# Patient Record
Sex: MALE | Race: BLACK OR AFRICAN AMERICAN
[De-identification: names, ages, dates, MRNs, and addresses within clinical notes are randomized per-mention and may not be internally consistent; named-entity substitution may affect disease eponyms.]

---

## 2019-09-04 ENCOUNTER — HOSPITAL ENCOUNTER (OUTPATIENT)
Dept: HOSPITAL 74 - JER | Age: 69
Setting detail: OBSERVATION
LOS: 1 days | Discharge: HOME | End: 2019-09-05
Attending: INTERNAL MEDICINE | Admitting: INTERNAL MEDICINE
Payer: MEDICARE

## 2019-09-04 VITALS — BODY MASS INDEX: 35.4 KG/M2

## 2019-09-04 DIAGNOSIS — E78.5: ICD-10-CM

## 2019-09-04 DIAGNOSIS — Z79.84: ICD-10-CM

## 2019-09-04 DIAGNOSIS — R07.89: Primary | ICD-10-CM

## 2019-09-04 DIAGNOSIS — Z87.891: ICD-10-CM

## 2019-09-04 DIAGNOSIS — E11.9: ICD-10-CM

## 2019-09-04 DIAGNOSIS — D72.1: ICD-10-CM

## 2019-09-04 DIAGNOSIS — I10: ICD-10-CM

## 2019-09-04 LAB
ALBUMIN SERPL-MCNC: 3.4 G/DL (ref 3.4–5)
ALP SERPL-CCNC: 100 U/L (ref 45–117)
ALT SERPL-CCNC: 19 U/L (ref 13–61)
ANION GAP SERPL CALC-SCNC: 3 MMOL/L (ref 8–16)
APTT BLD: 35.7 SECONDS (ref 25.2–36.5)
AST SERPL-CCNC: 9 U/L (ref 15–37)
BASOPHILS # BLD: 0.9 % (ref 0–2)
BILIRUB SERPL-MCNC: 0.3 MG/DL (ref 0.2–1)
BUN SERPL-MCNC: 8.5 MG/DL (ref 7–18)
CALCIUM SERPL-MCNC: 9.1 MG/DL (ref 8.5–10.1)
CHLORIDE SERPL-SCNC: 107 MMOL/L (ref 98–107)
CO2 SERPL-SCNC: 32 MMOL/L (ref 21–32)
CREAT SERPL-MCNC: 1 MG/DL (ref 0.55–1.3)
DEPRECATED RDW RBC AUTO: 13.8 % (ref 11.9–15.9)
EOSINOPHIL # BLD: 6.8 % (ref 0–4.5)
GLUCOSE SERPL-MCNC: 86 MG/DL (ref 74–106)
HCT VFR BLD CALC: 38.9 % (ref 35.4–49)
HGB BLD-MCNC: 12.9 GM/DL (ref 11.7–16.9)
INR BLD: 0.94 (ref 0.83–1.09)
LYMPHOCYTES # BLD: 39.9 % (ref 8–40)
MCH RBC QN AUTO: 28.2 PG (ref 25.7–33.7)
MCHC RBC AUTO-ENTMCNC: 33.1 G/DL (ref 32–35.9)
MCV RBC: 85.3 FL (ref 80–96)
MONOCYTES # BLD AUTO: 8.4 % (ref 3.8–10.2)
NEUTROPHILS # BLD: 44 % (ref 42.8–82.8)
PLATELET # BLD AUTO: 142 K/MM3 (ref 134–434)
PMV BLD: 10.3 FL (ref 7.5–11.1)
POTASSIUM SERPLBLD-SCNC: 4.2 MMOL/L (ref 3.5–5.1)
PROT SERPL-MCNC: 6.5 G/DL (ref 6.4–8.2)
PT PNL PPP: 11.1 SEC (ref 9.7–13)
RBC # BLD AUTO: 4.55 M/MM3 (ref 4–5.6)
SODIUM SERPL-SCNC: 142 MMOL/L (ref 136–145)
WBC # BLD AUTO: 5.4 K/MM3 (ref 4–10)

## 2019-09-04 PROCEDURE — G0378 HOSPITAL OBSERVATION PER HR: HCPCS

## 2019-09-04 PROCEDURE — 3E013GC INTRODUCTION OF OTHER THERAPEUTIC SUBSTANCE INTO SUBCUTANEOUS TISSUE, PERCUTANEOUS APPROACH: ICD-10-PCS | Performed by: INTERNAL MEDICINE

## 2019-09-04 PROCEDURE — A9502 TC99M TETROFOSMIN: HCPCS

## 2019-09-04 NOTE — PDOC
Attending Attestation





- Resident


Resident Name: JohannamylaBaldo





- ED Attending Attestation


I have performed the following: I have examined & evaluated the patient, The 

case was reviewed & discussed with the resident, I agree w/resident's findings 

& plan





- HPI


HPI: 





09/04/19 22:51


see resident hpi





- Physicial Exam


PE: 





09/04/19 22:52


agree with resident exam





- Medical Decision Making





09/04/19 22:54


68 yo male with chest pain and elevated Heart score


thought patient has recent travel exam, history and evaluation do not suggest PE


CXR showed NAPD


plan for admit to medical service





09/04/19 22:58

## 2019-09-04 NOTE — PDOC
History of Present Illness





- General


Chief Complaint: Chest Pain


Stated Complaint: CHEST PAINS


Time Seen by Provider: 09/04/19 19:39


History Source: Patient, Family


Exam Limitations: No Limitations





- History of Present Illness


Initial Comments: 





09/04/19 22:16


Roxy Grewal is a 69M with PMH HTN, NIDDM presenting with 3 days of R sided 

chest pain.





Patient has had a longstanding history of a sensation of pin pricks inside his 

R chest that comes and goes, and which he has thought of as gas and ignored. 

However, last 3 days has had multiple episodes concerning him enough to come to 

ED. Denies any inciting triggers or times of the day pain is related to, denies 

feeling the sensation being related to before or after eating. Denies shortness 

of breath, weakness, dizziness, abd pain. Says that sometimes his heart skips a 

beat, able to walk 2 blocks without getting tired, denies LE edema, denies 

injury to chest. Patient is from [facility] and arrived August 16, drives a lot. 

Heart has never been evaluated in the past for this.





NKDA, no surgeries, denies smoking history, alcohol rarely.








Past History





- Past Medical History


Allergies/Adverse Reactions: 


 Allergies











Allergy/AdvReac Type Severity Reaction Status Date / Time


 


No Known Allergies Allergy   Verified 09/05/19 02:11











Home Medications: 


Ambulatory Orders





Metformin HCl [Metformin HCl ER] 500 mg PO BID 09/05/19 


Ramipril [Altace] 5 mg PO DAILY 09/05/19 











- Suicide/Smoking/Psychosocial Hx


Smoking History: Former smoker


Have you smoked in the past 12 months: Yes


Information on smoking cessation initiated: No


Hx Alcohol Use: No


Drug/Substance Use Hx: No





Cardiac Specific PMH





- Complaint Specific PMHX


Abdominal Aortic Aneurysm: No


Angina: No


Cardiac Arrhythmia: No


Cardiac Stent: No


GERD: No


Myocardial Infarction: No


Pacemaker: No


Pulmonary Embolus: No


Valvular Heart Disease: No


Peripheral Vascular Disease: No





**Review of Systems





- Review of Systems


Able to Perform ROS?: Yes


Is the patient limited English proficient: No


Constitutional: No: Symptoms Reported


HEENTM: No: Symptoms Reported


Respiratory: No: Symptoms reported


Cardiac (ROS): Yes: Chest Pain, Irregular Heart Rate.  No: Lightheadedness, 

Chest Tightness


ABD/GI: No: Constipated, Diarrhea, Nausea, Vomiting


: No: Burning, Dysuria, Discharge, Frequency, Flank Pain, Hematuria, 

Incontinence, Pain, Urgency


Musculoskeletal: No: Back Pain, Neck Pain


Integumentary: No: Symptoms Reported


Neurological: No: Headache, Numbness, Paresthesia, Tingling


Endocrine: No: Symptoms Reported


Hematologic/Lymphatic: No: Symptoms Reported


All Other Systems: Reviewed and Negative





*Physical Exam





- Vital Signs


 Last Vital Signs











Temp Pulse Resp BP Pulse Ox


 


 98.8 F   69   19   154/62   99 


 


 09/04/19 19:38  09/04/19 19:38  09/04/19 19:38  09/04/19 19:38  09/05/19 01:30














- Physical Exam


General Appearance: Yes: Nourished, Appropriately Dressed, Obese.  No: Apparent 

Distress


HEENT: positive: EOMI, Normal ENT Inspection, Normal Voice, Symmetrical, 

Pharynx Normal.  negative: Scleral Icterus (R), Scleral Icterus (L), Pharyngeal 

Erythema, Tonsillar Exudate, Tonsillar Erythema, Nasal Congestion, Rhinorrhea


Neck: positive: Trachea midline, Normal Thyroid, Supple.  negative: Tender, 

Lymphadenopathy (R), Lymphadenopathy (L)


Respiratory/Chest: positive: Lungs Clear, Normal Breath Sounds, Respiratory 

Distress, Other (no obvious signs of injury to chest).  negative: Chest Tender, 

Decreased Breath Sounds, Crackles, Rales, Rhonchi


Cardiovascular: positive: Regular Rate, Irregularly Irregular (appreciated some 

possible afib).  negative: Edema


Gastrointestinal/Abdominal: positive: Normal Bowel Sounds, Soft, Protuberent.  

negative: Tender, Organomegaly, Hernia


Musculoskeletal: positive: Normal Inspection.  negative: CVA Tenderness, 

Decreased Range of Motion, Muscle Spasm, Vertebral Tenderness


Extremity: positive: Normal Capillary Refill, Normal Inspection, Normal Range 

of Motion.  negative: Tender


Integumentary: positive: Normal Color, Dry, Warm


Neurologic: positive: Fully Oriented, Alert, Normal Mood/Affect, Normal Response





**Heart Score/ECG Review





- History


History: Moderately suspicious





- Electrocardiogram


EKG: Normal





- Age


Age: >/= 65





- Risk Factors


Risk Factors Heart Score: Yes Hx Hypertension, Yes Hx Diabetes


Based on the list above the patient has:: >/=3 risk factors or Hx 

atherosclerotic disease





- Troponin


Troponin: </= normal limit





- Score


Heart Score - Total: 5





ED Treatment Course





- LABORATORY


CBC & Chemistry Diagram: 


 09/04/19 20:58





 09/04/19 20:58





- ADDITIONAL ORDERS


Additional order review: 


 Laboratory  Results











  09/04/19 09/04/19





  20:58 20:58


 


PT with INR  11.10 


 


INR  0.94 


 


PTT (Actin FS)  35.7 


 


Sodium   142


 


Potassium   4.2


 


Chloride   107


 


Carbon Dioxide   32


 


Anion Gap   3 L


 


BUN   8.5


 


Creatinine   1.0


 


Est GFR (CKD-EPI)AfAm   89.86


 


Est GFR (CKD-EPI)NonAf   77.54


 


Random Glucose   86


 


Calcium   9.1


 


Total Bilirubin   0.3


 


AST   9 L


 


ALT   19


 


Alkaline Phosphatase   100


 


Creatine Kinase   84


 


Troponin I   < 0.02


 


Total Protein   6.5


 


Albumin   3.4








 











  09/04/19





  20:58


 


RBC  4.55


 


MCV  85.3


 


MCHC  33.1


 


RDW  13.8


 


MPV  10.3


 


Neutrophils %  44.0


 


Lymphocytes %  39.9


 


Monocytes %  8.4


 


Eosinophils %  6.8 H


 


Basophils %  0.9














- RADIOLOGY


Radiology Studies Ordered: 














 Category Date Time Status


 


 CHEST X-RAY PORTABLE* [RAD] Stat Radiology  09/04/19 21:28 Completed














Medical Decision Making





- Medical Decision Making





09/04/19 22:16


Roxy Grewal is a 69M with PMH HTN, NIDDM presenting with 3 days of chest pain.





Patient presentation concerning for MI vs. AAA vs. GERD vs. ACS vs. PE vs. 

muscle spasm.





Will evaluate via:





CMP


CBC


CP


ECG


CXR





Initial troponins negative. ECG no ST elevations. However, has DM, obesity, HTN

, and history concerning for cardiac issues, has not had good f/u care.


HEART score 5, needs overnight observation for repeat troponins and ECHO.


No electrolyte imbalance noted on labs concerning for muscle spasms, etc.


Repeat ECG performed due to baseline artifact on 1st ECG, no concerning 

findings notable.





09/05/19 01:34


Discussed admission to tele/obs with Dr. Gillette under Dr. Reed.








*DC/Admit/Observation/Transfer


Diagnosis at time of Disposition: 


Chest pain


Qualifiers:


 Chest pain type: other chest pain Qualified Code(s): R07.89 - Other chest pain








- Discharge Dispostion


Decision to Admit order: Yes





- Referrals





- Patient Instructions





- Post Discharge Activity

## 2019-09-04 NOTE — PDOC
Rapid Medical Evaluation


Time Seen by Provider: 09/04/19 19:39


Medical Evaluation: 





09/04/19 19:39


I have performed a brief in-person evaluation of this patient.





The patient presents with a chief complaint of: chest pain


 


Pertinent physical exam findings:stable and in NAD, non-focal





I have ordered the following:labs, ekg





The patient will proceed to the ED for further evaluation.

## 2019-09-05 VITALS — SYSTOLIC BLOOD PRESSURE: 130 MMHG | HEART RATE: 65 BPM | DIASTOLIC BLOOD PRESSURE: 73 MMHG

## 2019-09-05 VITALS — TEMPERATURE: 97.8 F

## 2019-09-05 LAB
ANION GAP SERPL CALC-SCNC: 8 MMOL/L (ref 8–16)
BASOPHILS # BLD: 1 % (ref 0–2)
BUN SERPL-MCNC: 6.3 MG/DL (ref 7–18)
CALCIUM SERPL-MCNC: 8.5 MG/DL (ref 8.5–10.1)
CHLORIDE SERPL-SCNC: 104 MMOL/L (ref 98–107)
CHOLEST SERPL-MCNC: 117 MG/DL (ref 50–200)
CO2 SERPL-SCNC: 32 MMOL/L (ref 21–32)
CREAT SERPL-MCNC: 0.8 MG/DL (ref 0.55–1.3)
DEPRECATED RDW RBC AUTO: 13.7 % (ref 11.9–15.9)
EOSINOPHIL # BLD: 8.2 % (ref 0–4.5)
GLUCOSE SERPL-MCNC: 91 MG/DL (ref 74–106)
HCT VFR BLD CALC: 38.8 % (ref 35.4–49)
HDLC SERPL-MCNC: 39 MG/DL (ref 40–60)
HGB BLD-MCNC: 13.2 GM/DL (ref 11.7–16.9)
LYMPHOCYTES # BLD: 37.4 % (ref 8–40)
MAGNESIUM SERPL-MCNC: 2.3 MG/DL (ref 1.8–2.4)
MCH RBC QN AUTO: 28.7 PG (ref 25.7–33.7)
MCHC RBC AUTO-ENTMCNC: 34 G/DL (ref 32–35.9)
MCV RBC: 84.4 FL (ref 80–96)
MONOCYTES # BLD AUTO: 8.2 % (ref 3.8–10.2)
NEUTROPHILS # BLD: 45.2 % (ref 42.8–82.8)
PLATELET # BLD AUTO: 135 K/MM3 (ref 134–434)
PMV BLD: 10.3 FL (ref 7.5–11.1)
POTASSIUM SERPLBLD-SCNC: 3.8 MMOL/L (ref 3.5–5.1)
RBC # BLD AUTO: 4.6 M/MM3 (ref 4–5.6)
SODIUM SERPL-SCNC: 144 MMOL/L (ref 136–145)
TRIGL SERPL-MCNC: 56 MG/DL (ref 0–150)
WBC # BLD AUTO: 4.8 K/MM3 (ref 4–10)

## 2019-09-05 RX ADMIN — INSULIN ASPART SCH: 100 INJECTION, SOLUTION INTRAVENOUS; SUBCUTANEOUS at 12:42

## 2019-09-05 RX ADMIN — INSULIN ASPART SCH: 100 INJECTION, SOLUTION INTRAVENOUS; SUBCUTANEOUS at 17:19

## 2019-09-05 RX ADMIN — HEPARIN SODIUM SCH UNIT: 5000 INJECTION, SOLUTION INTRAVENOUS; SUBCUTANEOUS at 14:43

## 2019-09-05 RX ADMIN — HEPARIN SODIUM SCH: 5000 INJECTION, SOLUTION INTRAVENOUS; SUBCUTANEOUS at 12:42

## 2019-09-05 RX ADMIN — INSULIN ASPART SCH: 100 INJECTION, SOLUTION INTRAVENOUS; SUBCUTANEOUS at 06:38

## 2019-09-05 NOTE — PN
Teaching Attending Note


Name of Resident: Mira Gupta





ATTENDING PHYSICIAN STATEMENT





I saw and evaluated the patient.


I reviewed the resident's note and discussed the case with the resident.


I agree with the resident's findings and plan as documented.








SUBJECTIVE:cp has resolved. denies Cp, SOB, fever, chills, N/v/C/D








OBJECTIVE:


 Last Vital Signs











Temp Pulse Resp BP Pulse Ox


 


 97.8 F   65   18   130/73   96 


 


 09/05/19 08:20  09/05/19 14:43  09/05/19 14:43  09/05/19 14:43  09/05/19 14:43








General NAD


CV S1 S2 RRR no murmur/rub/gallop


Lungs CTA B/L no wheezing/rales/rhonchi





ASSESSMENT AND PLAN:


70yo M with PMH HTN, DM, and former smoker presented to the ER wtih CP x3 days


1. r/o ACS- trop neg x3. Echo and stress test done and are negative. seen by 

cardio. can d/c and follow up as outpatient

## 2019-09-05 NOTE — CON.CARD
Consult


Consult Specialty:: Cardiology


Referred by:: Jason Reed


Reason for Consultation:: Chest pain





- History of Present Illness


Chief Complaint: chest pain


History of Present Illness: 


69 year old male with a pmhx of dm, htn, and hld who presents with chest pain.  

Patient having intermittent right sided chest pain mild that comes and goes.  

Not exertional and non radiating.  No sob or palpitations associated.  Some 

dyspnea on exertion noted as well.


No pnd, orthopnea, or edema.  No palpitations.  No near syncope.








- History Source


History Provided By: Patient, Medical Record





- Alcohol/Substance Use


Hx Alcohol Use: No





- Smoking History


Smoking history: Former smoker


Have you smoked in the past 12 months: Yes





Home Medications





- Allergies


Allergies/Adverse Reactions: 


 Allergies











Allergy/AdvReac Type Severity Reaction Status Date / Time


 


ibuprofen Allergy   Verified 09/05/19 15:02














- Home Medications


Home Medications: 


Ambulatory Orders





Acetaminophen 500 mg PO QID 09/05/19 


Amlodipine Besylate [Norvasc -] 10 mg PO DAILY 09/05/19 


Aspirin Coated [Ecotrin -] 81 mg PO DAILY 09/05/19 


Atorvastatin Ca [Lipitor] 40 mg PO HS 09/05/19 


Doxazosin Mesylate 2 mg PO DAILY 09/05/19 


Metformin HCl [Metformin HCl ER] 500 mg PO BID 09/05/19 


Patient Specific Medications [Pt Own Med Drawer] 1 tab PO DAILY 09/05/19 


Ramipril [Altace] 5 mg PO DAILY 09/05/19 


Vitamin B Complex [B Complex] 1 tab PO DAILY 09/05/19 


metFORMIN HCL [Metformin HCl] 500 mg PO BID 09/05/19 








Vital Signs: 


 Vital Signs











Temperature  97.8 F   09/05/19 08:20


 


Pulse Rate  65   09/05/19 14:43


 


Respiratory Rate  18   09/05/19 14:43


 


Blood Pressure  130/73   09/05/19 14:43


 


O2 Sat by Pulse Oximetry (%)  96   09/05/19 14:43











Constitutional: Yes: No Distress


Neck: Yes: Supple


Respiratory: Yes: CTA Bilaterally


Gastrointestinal: Yes: Soft


Cardiovascular: Yes: Regular Rate and Rhythm


JVD: No


Carotid Bruit: No


PMI: Non-Displaced


Heart Sounds: Yes: S1, S2


Murmur: No: Systolic Murmur


Edema: No





- Other Data


Labs, Other Data: 


 CBC, BMP





 09/05/19 06:30 





 09/05/19 06:30 





 INR, PTT











INR  0.94  (0.83-1.09)   09/04/19  20:58    








 Troponin, BNP











  09/04/19 09/05/19 09/05/19





  20:58 01:43 06:30


 


Troponin I  < 0.02  < 0.02  < 0.02








 Troponin, BNP











  09/04/19 09/05/19 09/05/19





  20:58 01:43 06:30


 


Troponin I  < 0.02  < 0.02  < 0.02














Imaging





- Results


Chest X-ray: Report Reviewed


EKG: Image Reviewed





Problem List





- Problems


(1) Chest pain


Code(s): R07.9 - CHEST PAIN, UNSPECIFIED   


Qualifiers: 


   Chest pain type: other chest pain   Qualified Code(s): R07.89 - Other chest 

pain; R07.8 - Other chest pain   





Assessment/Plan


69 year old male with a pmhx of dm, htn, and hld who presents with chest pain.  

Patient having intermittent right sided chest pain mild that comes and goes.  

Not exertional and non radiating.  No sob or palpitations associated.  Some 

dyspnea on exertion noted as well.


No pnd, orthopnea, or edema.  No palpitations.  No near syncope.





1) Atypical chest pain


-EKG: sinus rhythm at 60bpm, nl axis, nonspecific t wave abnormalities.


No acute ischemic ecg changes. 


CE's negative


-Echocardiogram normal LVEF and no significant valve disease


NST with normal myocardial perfusion.


No events on tele.


No signs of chf on exam, cxr, or bnp level





No further cardiac testing at this time.  Aspirin/statin and bp control.





Follow up with Dr. Mcelroy as an outpatient [phone]

## 2019-09-05 NOTE — ECHO
______________________________________________________________________________



Name: LESLI BHAT                                   Exam:Adult Echocardiogram

MRN: [id number]         Study Date: 2019 10:10 AM

Age: 69 yrs

______________________________________________________________________________



Reason For Study: Chest pain

Height: 69 in        Weight: 240 lb        BSA: 2.2 m2



______________________________________________________________________________



MMode/2D Measurements & Calculations

IVSd: 1.1 cm                                          Ao root diam: 3.1 cm

LVIDd: 4.6 cm                                         LA dimension: 4.0 cm

LVIDs: 3.0 cm

LVPWd: 0.95 cm



_______________________________________________________

EDV(Teich): 97.3 ml                                   LVOT diam: 2.2 cm

ESV(Teich): 33.7 ml



Doppler Measurements & Calculations

MV E max isaac: 96.7 cm/sec                            Ao V2 max: 132.6 cm/sec

MV A max isaac: 22.2 cm/sec                            Ao max P.0 mmHg

MV E/A: 4.4                                          Ao V2 mean: 90.0 cm/sec

MV dec time: 0.23 sec                                Ao mean PG: 3.8 mmHg

                                                     Ao V2 VTI: 33.6 cm



                                                     JESSICA(I,D): 3.1 cm2

                                                     JESSICA(V,D): 2.9 cm2



______________________________________________________

LV V1 max P.0 mmHg                               SV(LVOT): 104.6 ml

LV V1 mean P.2 mmHg

LV V1 max: 100.2 cm/sec

LV V1 mean: 71.0 cm/sec

LV V1 VTI: 27.2 cm

______________________________________________________



TR max isaac: 222.9 cm/sec                             Med Peak E' Isaac: 4.5 cm/sec

TR max P.2 mmHg                                 Med E/e': 21.6

                                                     Lat Peak E' Isaac: 7.8 cm/sec

                                                     Lat E/e': 12.4





______________________________________________________________________________

Procedure

A complete two-dimensional transthoracic echocardiogram was performed (2D, M-mode, Doppler and color 
flow

Doppler).

Left Ventricle

The left ventricular size, thickness and function are normal. The left ventricular ejection fraction 
is

normal. Ejection Fraction = 60-65%. The left ventricular wall motion is normal.

Right Ventricle

The right ventricle is normal in size and function.

Atria

Normal left and right atrial size and function.

Mitral Valve

There is no mitral regurgitation noted.

Tricuspid Valve

There is trace tricuspid regurgitation. There was insufficient TR detected to calculate RV systolic p
ressure.

Aortic Valve

No hemodynamically significant valvular aortic stenosis. No aortic regurgitation is present.

Pulmonic Valve

There is no pulmonic valvular regurgitation.

Great Vessels

The aortic root is normal size.

Pericardium/Pleura

There is no pericardial effusion.

______________________________________________________________________________





Interpretation Summary

The left ventricular size, thickness and function are normal

The right ventricle is normal in size and function.

There is trace tricuspid regurgitation.





MD Ulices Post 2019 02:57 PM

## 2019-09-05 NOTE — EKG
Test Reason : 

Blood Pressure : ***/*** mmHG

Vent. Rate : 060 BPM     Atrial Rate : 060 BPM

   P-R Int : 142 ms          QRS Dur : 082 ms

    QT Int : 424 ms       P-R-T Axes : 059 017 050 degrees

   QTc Int : 424 ms

 

NORMAL SINUS RHYTHM WITH SINUS ARRHYTHMIA

NONSPECIFIC T WAVE ABNORMALITY

ABNORMAL ECG

WHEN COMPARED WITH ECG OF 04-SEP-2019 20:02,

NO SIGNIFICANT CHANGE WAS FOUND

Confirmed by MITCHEL MEADOWS, JENNY ([id number]) on 9/5/2019 12:24:02 PM

 

Referred By:             Confirmed By:JENNY GRULLON MD

## 2019-09-05 NOTE — PN
Teaching Attending Note


Name of Resident: Dash Felipe





ATTENDING PHYSICIAN STATEMENT





I saw and evaluated the patient.


Chart, data, imaging reviewed. 


I reviewed the resident's note and discussed the case with the resident.


I agree with the resident's findings and plan as documented.








SUBJECTIVE:


70yo man from [facility] with htn, dm, former smoker (quit 20 years ago) obesity c/

o 3  days of intermittent right chest pressure like pain, no radiation worse 

after eating, no relation to exercise. Denied family history of cardiac 

disease. Never seen cardiologist or had cardiac stress test. No shortness of 

breath, nausea, or vomiting. 





OBJECTIVE:


 Last Vital Signs











Temp Pulse Resp BP Pulse Ox


 


 98.8 F   69   19   154/62   99 


 


 09/04/19 19:38  09/04/19 19:38  09/04/19 19:38  09/04/19 19:38  09/04/19 19:38








general- nad, appears comfortable 


cor-s1+s2+ rrr, no murmurs appreciated 


chest - clear lung sounds b/l 


abdomen-obese, no ruq tenderness 


lower ext -1+ pitting edema in shin b/l 














 Abnormal Lab Results











  09/04/19 09/04/19





  20:58 20:58


 


Eosinophils %  6.8 H 


 


Anion Gap   3 L


 


AST   9 L





imaging ,ekg reviewed 





ASSESSMENT AND PLAN:


atypical chest pain, heart score- 4. R/o out gallstones as pain worse after 

eating. LFTs wnl. ekg with out ischemic changes. Patient currently denied any 

pain. 


-tele/obs 


-echo 


-trend trops 


-cardiology eval - for cardiac stress test 


-liver u/s to r/o gallstones 





#DM 


-send a1c 


-insulin sliding scale 





#Eosinophilia 


-trend eos 


-no symptoms or evidence of parasitic infection at this time 





#HTN 


-HCTZ 





dvt ppx- heparin sc

## 2019-09-05 NOTE — DS
Physical Exam: 


SUBJECTIVE: Patient seen and examined. Denies any current chest pain, SOB, n/v.








OBJECTIVE:





 Vital Signs











 Period  Temp  Pulse  Resp  BP Sys/Diane  Pulse Ox


 


 Last 24 Hr  97.8 F-98.1 F  54-68  18-20  128-153/53-95  96-99








PHYSICAL EXAM





GENERAL: The patient is awake, alert, and fully oriented, in no acute distress.


HEAD: Normal with no signs of trauma.


EYES: PERRL, extraocular movements intact, sclera anicteric, conjunctiva clear. 


ENT: Ears normal, nares patent, oropharynx clear without exudates, moist mucous 

membranes.


NECK: Trachea midline, full range of motion, supple. 


LUNGS: Breath sounds equal, clear to auscultation bilaterally, no wheezes, no 

crackles, no accessory muscle use. 


HEART: Regular rate and rhythm, S1, S2 without murmur, rub or gallop.


ABDOMEN: Soft, nontender, nondistended, normoactive bowel sounds, no guarding, 

no rebound, no hepatosplenomegaly, no masses.


EXTREMITIES: 2+ pulses, warm, well-perfused, no edema. 


NEUROLOGICAL: Cranial nerves II through XII grossly intact. Normal speech, gait 

not observed.


PSYCH: Normal mood, normal affect.


SKIN: Warm, dry, normal turgor, no rashes or lesions noted.





LABS


 Laboratory Results - last 24 hr











  09/04/19 09/04/19 09/04/19





  20:58 20:58 20:58


 


WBC  5.4  


 


RBC  4.55  


 


Hgb  12.9  


 


Hct  38.9  


 


MCV  85.3  


 


MCH  28.2  


 


MCHC  33.1  


 


RDW  13.8  


 


Plt Count  142  


 


MPV  10.3  


 


Absolute Neuts (auto)  2.4  


 


Neutrophils %  44.0  


 


Lymphocytes %  39.9  


 


Monocytes %  8.4  


 


Eosinophils %  6.8 H  


 


Basophils %  0.9  


 


Nucleated RBC %  0  


 


PT with INR    11.10


 


INR    0.94


 


PTT (Actin FS)    35.7


 


Sodium   142 


 


Potassium   4.2 


 


Chloride   107 


 


Carbon Dioxide   32 


 


Anion Gap   3 L 


 


BUN   8.5 


 


Creatinine   1.0 


 


Est GFR (CKD-EPI)AfAm   89.86 


 


Est GFR (CKD-EPI)NonAf   77.54 


 


POC Glucometer   


 


Random Glucose   86 


 


Hemoglobin A1c %   


 


Calcium   9.1 


 


Magnesium   


 


Total Bilirubin   0.3 


 


AST   9 L 


 


ALT   19 


 


Alkaline Phosphatase   100 


 


Creatine Kinase   84 


 


Troponin I   < 0.02 


 


Total Protein   6.5 


 


Albumin   3.4 


 


Triglycerides   


 


Cholesterol   


 


Total LDL Cholesterol   


 


HDL Cholesterol   


 


TSH   














  09/05/19 09/05/19 09/05/19





  01:43 06:30 06:30


 


WBC   4.8 


 


RBC   4.60 


 


Hgb   13.2 


 


Hct   38.8 


 


MCV   84.4 


 


MCH   28.7 


 


MCHC   34.0 


 


RDW   13.7 


 


Plt Count   135 


 


MPV   10.3 


 


Absolute Neuts (auto)   2.2 


 


Neutrophils %   45.2 


 


Lymphocytes %   37.4 


 


Monocytes %   8.2 


 


Eosinophils %   8.2 H 


 


Basophils %   1.0 


 


Nucleated RBC %   0 


 


PT with INR   


 


INR   


 


PTT (Actin FS)   


 


Sodium    144


 


Potassium    3.8


 


Chloride    104


 


Carbon Dioxide    32


 


Anion Gap    8


 


BUN    6.3 L


 


Creatinine    0.8


 


Est GFR (CKD-EPI)AfAm    107.13


 


Est GFR (CKD-EPI)NonAf    92.44


 


POC Glucometer   


 


Random Glucose    91


 


Hemoglobin A1c %   


 


Calcium    8.5


 


Magnesium    2.3


 


Total Bilirubin   


 


AST   


 


ALT   


 


Alkaline Phosphatase   


 


Creatine Kinase   


 


Troponin I  < 0.02   < 0.02


 


Total Protein   


 


Albumin   


 


Triglycerides    56


 


Cholesterol    117


 


Total LDL Cholesterol    67


 


HDL Cholesterol    39 L


 


TSH    1.08














  09/05/19 09/05/19 09/05/19





  06:30 06:35 17:17


 


WBC   


 


RBC   


 


Hgb   


 


Hct   


 


MCV   


 


MCH   


 


MCHC   


 


RDW   


 


Plt Count   


 


MPV   


 


Absolute Neuts (auto)   


 


Neutrophils %   


 


Lymphocytes %   


 


Monocytes %   


 


Eosinophils %   


 


Basophils %   


 


Nucleated RBC %   


 


PT with INR   


 


INR   


 


PTT (Actin FS)   


 


Sodium   


 


Potassium   


 


Chloride   


 


Carbon Dioxide   


 


Anion Gap   


 


BUN   


 


Creatinine   


 


Est GFR (CKD-EPI)AfAm   


 


Est GFR (CKD-EPI)NonAf   


 


POC Glucometer   90  155


 


Random Glucose   


 


Hemoglobin A1c %  6.6 H  


 


Calcium   


 


Magnesium   


 


Total Bilirubin   


 


AST   


 


ALT   


 


Alkaline Phosphatase   


 


Creatine Kinase   


 


Troponin I   


 


Total Protein   


 


Albumin   


 


Triglycerides   


 


Cholesterol   


 


Total LDL Cholesterol   


 


HDL Cholesterol   


 


TSH   











HOSPITAL COURSE:


Mr. Grewal is a 68y/o male with HTN, DM, cataracts presents after intermittent 

right sided chest pain x 3 days. There is no association with activity. Cardiac 

exam was unremarkable. EKG showed non-specific T wave flattening, and trops 

were negative x2. Echo showed normal EF, and stress test had no myocardial 

ischemia. No significant events on tele. He is cleared for d/c and referred to 

Dr. Mcelroy for out pt f/u. Pt is to continue ASA, statin, and HTN meds.





Date of Admission:09/04/19





Date of Discharge: 09/05/19











Minutes to complete discharge: 35





Discharge Summary


Reason For Visit: CHEST PAINS


Current Active Problems





Chest pain (Acute)


Diabetes mellitus (Chronic)


Hypertension (Chronic)








Condition: Stable





- Instructions


Diet, Activity, Other Instructions: 


Hospital Visit:


You were admitted to the hospital for chest pain. The stress test of your heart 

was performed, with normal results. You were evaluated by the cardiologist and 

are cleared for discharge home. 





Medications:


You may continue your home medications as directed.





Follow up:


Please follow up with Dr. Mcelroy in the next week to discuss further testing 

that may need to be done.


Please follow up with your primary care doctor in the next week to discuss your 

diabetes.


Your hemoglobin A1C, the test that helps with diabetes management, was slightly 

elevated at 6.6. You may need medication adjustments, so please follow up with 

your primary care doctor within the next week. Monitor your diet in the meantime

, and do not eat foods that contain a lot of sugar, like cakes and soda.





Other Instructions:


Return to the nearest emergency room if you experience worsening symptoms, 

chest pain, shortness of breath, dizziness, nausea and vomiting, or loss of 

consciousness.


Referrals: 


Johnathon Singer MD [Staff Physician] - 


Alex Mcelroy MD [Staff Physician] - 


Disposition: HOME





- Home Medications


Comprehensive Discharge Medication List: 


Ambulatory Orders





Acetaminophen 500 mg PO QID 09/05/19 


Amlodipine Besylate [Norvasc -] 10 mg PO DAILY 09/05/19 


Aspirin Coated [Ecotrin -] 81 mg PO DAILY 09/05/19 


Atorvastatin Ca [Lipitor] 40 mg PO HS 09/05/19 


Doxazosin Mesylate 2 mg PO DAILY 09/05/19 


Ramipril [Altace] 5 mg PO DAILY 09/05/19 


Vitamin B Complex [B Complex] 1 tab PO DAILY 09/05/19 


metFORMIN HCL [Metformin HCl] 500 mg PO BID 09/05/19 








This patient is new to me today: Yes


Date on this admission: 09/04/19


Emergency Visit: Yes


ED Registration Date: 09/04/19


Care time: The patient presented to the Emergency Department on the above date 

and was hospitalized for further evaluation of their emergent condition.


Critical Care patient: No





- Discharge Referral


Referred to [facility] No





ATTENDING PHYSICIAN STATEMENT





I saw and evaluated the patient.


I reviewed the resident's note and discussed the case with the resident.


I agree with the resident's findings and plan as documented.








SUBJECTIVE:








OBJECTIVE:








ASSESSMENT AND PLAN:

## 2019-09-05 NOTE — EKG
Test Reason : 

Blood Pressure : ***/*** mmHG

Vent. Rate : 060 BPM     Atrial Rate : 060 BPM

   P-R Int : 136 ms          QRS Dur : 078 ms

    QT Int : 402 ms       P-R-T Axes : 059 001 027 degrees

   QTc Int : 402 ms

 

NORMAL SINUS RHYTHM

NONSPECIFIC T WAVE ABNORMALITY

ABNORMAL ECG

NO PREVIOUS ECGS AVAILABLE

Confirmed by MITCHEL MEADOWS, JENNY ([id number]) on 9/5/2019 12:25:13 PM

 

Referred By:             Confirmed By:JENNY GRULLON MD

## 2019-09-05 NOTE — HP
CHIEF COMPLAINT: R sided chest pain





PCP: In [facility]





HISTORY OF PRESENT ILLNESS:


Roxy Grewal is a 69 year old male with a past medical history of hypertension

, diabetes, cataracts, "prostate issue" who presented to the ED with 3 days 

worth of R sided chest pain. The patient stated that over the last 3 days, he 

had been having sudden onset right sided chest pain that feels like a shock and 

sharp pain on his chest that causes him a brief moment of weakness and then 

becomes a dull pain and goes away in approximately 5 minutes. The pain occurs 

anywhere from 1 to 3 times per day and is not associated with any particular 

time of the day and not worsened with movement. Pain is occasionally worsened 

with food but not always directly associated with eating. He states that he has 

had this kind of pain before that he attributed to gas several months prior 

however now he states that the pain is worse. States that the pain is 5-6/10 at 

its worst. Pointed to the right side of his chest, midclavicular line when 

asked to point to where the pain is the worst when he gets it. He has not taken 

anything for alleviation of pain. Denied any recent trauma to the area or 

strenuous activity. Denied any episodes of syncope, loss of consciousness, 

falls. Does not have associated symptoms of fever, chills, shortness of breath, 

abdominal pain, nausea, vomiting, headaches, visual changes, dysuria, hematuria

, increased belching or gas.


States he does have urinary frequency and takes a medication he is unsure of 

for a prostate issue.





ER course was notable for:


(1) EKG NSR with flattening of T waves in all leads except V1, V2, V3, V4


(2) negative troponins


(3) CXR with no acute pathology





Recent Travel:


recently travelled from [facility] [place]





PAST MEDICAL HISTORY:


as above





PAST SURGICAL HISTORY:


denies





Social History:


Smoking: former smoker, quit more than 20 years ago


Alcohol: socially, rarely


Drugs: denies


Currently visiting sister in [place], lives in [facility]





Family History:


Mother- HTN





Allergies





No Known Allergies Allergy (Verified 09/04/19 19:43)


 








HOME MEDICATIONS:


REVIEW OF SYSTEMS


CONSTITUTIONAL: brief weakness episodes


Absent:  fever, chills, diaphoresis, malaise, loss of appetite, weight change


HEENT: 


Absent:  rhinorrhea, nasal congestion, throat pain, throat swelling, difficulty 

swallowing, visual changes


CARDIOVASCULAR: intermittent chest pain


Absent: syncope, palpitations, irregular heart rate, lightheadedness, 

peripheral edema


RESPIRATORY: 


Absent: cough, shortness of breath, dyspnea with exertion, orthopnea, wheezing, 


GASTROINTESTINAL:


Absent: abdominal pain, abdominal distension, nausea, vomiting, diarrhea, 

constipation,


GENITOURINARY: frequency at night


Absent: dysuria, urgency, hesitancy, hematuria, flank pain


MUSCULOSKELETAL: 


Absent: myalgia, arthralgia, joint swelling, back pain, neck pain


SKIN: 


Absent: rash, itching, pallor


HEMATOLOGIC/IMMUNOLOGIC: 


Absent: easy bleeding, easy bruising, lymphadenopathy, frequent infections


ENDOCRINE:


Absent: unexplained weight gain, unexplained weight loss, heat intolerance, 

cold intolerance


NEUROLOGIC: 


Absent: headache, focal weakness or paresthesias, dizziness, unsteady gait, 

seizure, mental status changes,


PSYCHIATRIC: 


Absent: anxiety, depression, suicidal or homicidal ideation, hallucinations.








PHYSICAL EXAMINATION


 Vital Signs - 24 hr











  09/04/19





  19:38


 


Temperature 98.8 F


 


Pulse Rate 69


 


Respiratory 19





Rate 


 


Blood Pressure 154/62


 


O2 Sat by Pulse 99





Oximetry (%) 











GENERAL: Awake, alert, and fully oriented, in no acute distress.


HEAD: Normal with no signs of trauma.


EYES: Pupils equal, round and reactive to light, extraocular movements intact, 

sclera anicteric, conjunctival injection.


EARS, NOSE, THROAT: Oropharynx clear without exudates. Moist mucous membranes.


NECK: Normal range of motion, supple without lymphadenopathy, JVD. 


LUNGS: Breath sounds equal, clear to auscultation bilaterally. No wheezes, and 

no crackles. No accessory muscle use.


HEART: Regular rate and rhythm, normal S1 and S2 without murmur, rub.


ABDOMEN: Soft, obese, nontender, normoactive bowel sounds, no guarding, no 

rebound, no masses.  Increased bowel sounds in LUQ.


MUSCULOSKELETAL: Normal range of motion at all joints. No bony deformities or 

tenderness. No CVA tenderness.


UPPER EXTREMITIES: 2+ pulses, warm, well-perfused. No cyanosis. No clubbing. No 

peripheral edema.


LOWER EXTREMITIES: 1+ pulses, warm, well-perfused. No calf tenderness. 1+ 

peripheral edema bilaterally.


NEUROLOGICAL:  Cranial nerves II-XII intact. 5/5 muscle strength bilaterally 

upper and lower extremities. 


PSYCHIATRIC: Cooperative. Good eye contact. Appropriate mood and affect.


SKIN: Warm, dry, normal turgor, dry skin on feet.





 Laboratory Results - last 24 hr











  09/04/19 09/04/19 09/04/19





  20:58 20:58 20:58


 


WBC  5.4  


 


RBC  4.55  


 


Hgb  12.9  


 


Hct  38.9  


 


MCV  85.3  


 


MCH  28.2  


 


MCHC  33.1  


 


RDW  13.8  


 


Plt Count  142  


 


MPV  10.3  


 


Absolute Neuts (auto)  2.4  


 


Neutrophils %  44.0  


 


Lymphocytes %  39.9  


 


Monocytes %  8.4  


 


Eosinophils %  6.8 H  


 


Basophils %  0.9  


 


Nucleated RBC %  0  


 


PT with INR    11.10


 


INR    0.94


 


PTT (Actin FS)    35.7


 


Sodium   142 


 


Potassium   4.2 


 


Chloride   107 


 


Carbon Dioxide   32 


 


Anion Gap   3 L 


 


BUN   8.5 


 


Creatinine   1.0 


 


Est GFR (CKD-EPI)AfAm   89.86 


 


Est GFR (CKD-EPI)NonAf   77.54 


 


Random Glucose   86 


 


Calcium   9.1 


 


Total Bilirubin   0.3 


 


AST   9 L 


 


ALT   19 


 


Alkaline Phosphatase   100 


 


Creatine Kinase   84 


 


Troponin I   < 0.02 


 


Total Protein   6.5 


 


Albumin   3.4 











EKG--> NSR with flattening of T waves in all leads except V1, V2, V3, V4, QTc 

424





ASSESSMENT/PLAN:


Roxy Grewal is a 69 year old male with a past medical history of hypertension

, diabetes, cataracts, "prostate issue" admitted after a several day history of 

chest pain to rule out acute coronary syndrome in the context of the patient's 

multiple medical comorbidities.





Chest Pain


HTN


HLD


DM





Chest Pain


- right sided chest pain with negative troponins, clear chest x-ray, clinically 

low suspicion for ACS


- Heart SCORE 4, age >65, 3+ risk factors


- admitted for rule out of ACS


- first EKG with no significant findings attributable to ACS


- repeat EKG in the morning


- first troponin negative, continue to trend


- echo


- cardiology consultation to consider stress test, patient never had one 

previously


- pt will be returning to [facility] within next 2 weeks, can consider performing 

stress test at this facility, outpatient, or in [facility]


- lipids, A1c to assess if adequately controlled for ACS risk factors


- liver U/s to rule out gallstones





HTN


- on BP medication at home


- family will bring in medication in the morning to reconcile


- HCTZ 12.5mg until patient's home meds reconciled





HLD


- on cholesterol medication, family will bring in medication in the morning to 

reconcile





DM


- BGM ACHS


- ISS





FEN


- no standing fluids


- continue to monitor electrolytes and replete as necessary


- diabetic sodium controlled diet





Prophylaxis


- heparin 5000 units subq tid





Code


- full code





VALENTINE NELSON DO - PGY-1





Visit type





- Emergency Visit


Emergency Visit: Yes


ED Registration Date: 09/04/19


Care time: The patient presented to the Emergency Department on the above date 

and was hospitalized for further evaluation of their emergent condition.





- New Patient


This patient is new to me today: Yes


Date on this admission: 09/05/19





- Critical Care


Critical Care patient: No

## 2019-09-06 NOTE — EKG
Test Reason : 

Blood Pressure : ***/*** mmHG

Vent. Rate : 056 BPM     Atrial Rate : 056 BPM

   P-R Int : 158 ms          QRS Dur : 086 ms

    QT Int : 424 ms       P-R-T Axes : 058 -07 083 degrees

   QTc Int : 409 ms

 

SINUS BRADYCARDIA

NONSPECIFIC T WAVE ABNORMALITY

ABNORMAL ECG

WHEN COMPARED WITH ECG OF 04-SEP-2019 23:00,

NO SIGNIFICANT CHANGE WAS FOUND

Confirmed by JHOANA BOLAND MD ([id number]) on 9/6/2019 2:11:06 PM

 

Referred By:             Confirmed By:JHOANA BOLAND MD